# Patient Record
Sex: FEMALE | Race: ASIAN | NOT HISPANIC OR LATINO
[De-identification: names, ages, dates, MRNs, and addresses within clinical notes are randomized per-mention and may not be internally consistent; named-entity substitution may affect disease eponyms.]

---

## 2021-12-09 PROBLEM — Z00.00 ENCOUNTER FOR PREVENTIVE HEALTH EXAMINATION: Status: ACTIVE | Noted: 2021-12-09

## 2021-12-10 ENCOUNTER — NON-APPOINTMENT (OUTPATIENT)
Age: 26
End: 2021-12-10

## 2021-12-10 ENCOUNTER — RESULT CHARGE (OUTPATIENT)
Age: 26
End: 2021-12-10

## 2021-12-10 ENCOUNTER — APPOINTMENT (OUTPATIENT)
Dept: CARDIOLOGY | Facility: CLINIC | Age: 26
End: 2021-12-10
Payer: COMMERCIAL

## 2021-12-10 VITALS
DIASTOLIC BLOOD PRESSURE: 80 MMHG | TEMPERATURE: 97.2 F | HEIGHT: 62 IN | WEIGHT: 154 LBS | RESPIRATION RATE: 16 BRPM | OXYGEN SATURATION: 98 % | HEART RATE: 72 BPM | SYSTOLIC BLOOD PRESSURE: 100 MMHG | BODY MASS INDEX: 28.34 KG/M2

## 2021-12-10 DIAGNOSIS — R07.89 OTHER CHEST PAIN: ICD-10-CM

## 2021-12-10 DIAGNOSIS — Z78.9 OTHER SPECIFIED HEALTH STATUS: ICD-10-CM

## 2021-12-10 DIAGNOSIS — R00.0 TACHYCARDIA, UNSPECIFIED: ICD-10-CM

## 2021-12-10 DIAGNOSIS — R00.2 PALPITATIONS: ICD-10-CM

## 2021-12-10 PROCEDURE — 93000 ELECTROCARDIOGRAM COMPLETE: CPT

## 2021-12-10 PROCEDURE — 99204 OFFICE O/P NEW MOD 45 MIN: CPT

## 2021-12-10 NOTE — HISTORY OF PRESENT ILLNESS
[FreeTextEntry1] : 25 y/o female with no prior cardiac history presents for initial evaluation. Patient reports that after getting a Booster COVID-19 vaccination 2 weeks ago she started getting episodes of pleuritic chest pain, and tachycardia, she checked her heart rate on Apple Watch, finding HR as high as 104 at rest. She reports her usual HR is upper 50's, but recently usually at 70 at rest. She reports her symptoms have improved in the last few days, but she is still not 100% back to normal. She presents to discuss her symptoms. She is very worried about possibility of heart muscle damage.

## 2021-12-10 NOTE — ASSESSMENT
[FreeTextEntry1] : 25 y/o female with history of above symptoms after a booster shot of COVID-19 Pfizer mRNA vaccine.\par \par No ECG changes to suspect pericarditis.\par ECG - NSR at 74\par No ST changes\par No abnormal findings on exam.\par Patient reassured.\par Limited echo done at bedside - normal LV function with no evidence of effusion. Normal EF.\par Possibility of low grade pericarditis discussed. Course discussed, but I feel her risk is very low.\par Would not treat empirically with colchicine at this time.\par Patient advised to have ESR, CRP and CKMB sent - she would like to hold off.\par Also offered a Holter and full echo - she would like to hold off as well as her symptoms have improved.\par She will monitor symptoms and return if no improvement in 2 weeks.\par Primary prevention discussed.\par If she feels back to normal - f/u PRN.\par F/u with PMD.